# Patient Record
Sex: MALE | Race: WHITE
[De-identification: names, ages, dates, MRNs, and addresses within clinical notes are randomized per-mention and may not be internally consistent; named-entity substitution may affect disease eponyms.]

---

## 2021-12-14 ENCOUNTER — HOSPITAL ENCOUNTER (OUTPATIENT)
Dept: HOSPITAL 47 - RADMAMWWP | Age: 26
Discharge: HOME | End: 2021-12-14
Attending: FAMILY MEDICINE
Payer: COMMERCIAL

## 2021-12-14 DIAGNOSIS — N62: Primary | ICD-10-CM

## 2021-12-14 NOTE — USB
Reason for exam: clinical finding.



Physical Findings:

Nurse did not find any significant physical abnormalities on exam.



US Breast LT

Left limited breast ultrasound including focal area of concern, retroareolar and 

axilla demonstrates a 2.3 x 1.7 x 1.1cm gynecomastia appearance, flame shaped 

subareolar irregular area at the nipple.



Right limited breast ultrasound including focal area of concern, retroareolar and 

axilla demonstrates gynecomastia trace at the nipple.



Bilateral nipples and axilla scanned.



These results were verbally communicated with the patient and result sheet given 

to the patient on 12/14/21.





ASSESSMENT: Benign, BI-RAD 2



RECOMMENDATION:

Clinical management of both breasts.

Manage on a clinical basis with regard to gynecomastia greater in the left breast,

correlate for potential causes.